# Patient Record
Sex: FEMALE | Race: WHITE
[De-identification: names, ages, dates, MRNs, and addresses within clinical notes are randomized per-mention and may not be internally consistent; named-entity substitution may affect disease eponyms.]

---

## 2018-06-01 PROBLEM — Z00.00 ENCOUNTER FOR PREVENTIVE HEALTH EXAMINATION: Status: ACTIVE | Noted: 2018-06-01

## 2018-07-11 ENCOUNTER — LABORATORY RESULT (OUTPATIENT)
Age: 38
End: 2018-07-11

## 2018-07-11 ENCOUNTER — APPOINTMENT (OUTPATIENT)
Dept: INTERNAL MEDICINE | Facility: CLINIC | Age: 38
End: 2018-07-11
Payer: MEDICARE

## 2018-07-11 VITALS
HEART RATE: 105 BPM | DIASTOLIC BLOOD PRESSURE: 72 MMHG | TEMPERATURE: 98.6 F | HEIGHT: 66 IN | BODY MASS INDEX: 21.86 KG/M2 | RESPIRATION RATE: 16 BRPM | OXYGEN SATURATION: 97 % | SYSTOLIC BLOOD PRESSURE: 100 MMHG | WEIGHT: 136 LBS

## 2018-07-11 DIAGNOSIS — F41.9 ANXIETY DISORDER, UNSPECIFIED: ICD-10-CM

## 2018-07-11 DIAGNOSIS — R10.9 UNSPECIFIED ABDOMINAL PAIN: ICD-10-CM

## 2018-07-11 DIAGNOSIS — Z00.01 ENCOUNTER FOR GENERAL ADULT MEDICAL EXAMINATION WITH ABNORMAL FINDINGS: ICD-10-CM

## 2018-07-11 DIAGNOSIS — G89.29 UNSPECIFIED ABDOMINAL PAIN: ICD-10-CM

## 2018-07-11 DIAGNOSIS — E83.42 HYPOMAGNESEMIA: ICD-10-CM

## 2018-07-11 DIAGNOSIS — E55.9 VITAMIN D DEFICIENCY, UNSPECIFIED: ICD-10-CM

## 2018-07-11 DIAGNOSIS — E87.6 HYPOKALEMIA: ICD-10-CM

## 2018-07-11 DIAGNOSIS — F51.01 PRIMARY INSOMNIA: ICD-10-CM

## 2018-07-11 DIAGNOSIS — Z81.1 FAMILY HISTORY OF ALCOHOL ABUSE AND DEPENDENCE: ICD-10-CM

## 2018-07-11 DIAGNOSIS — Z82.5 FAMILY HISTORY OF ASTHMA AND OTHER CHRONIC LOWER RESPIRATORY DISEASES: ICD-10-CM

## 2018-07-11 DIAGNOSIS — F17.200 NICOTINE DEPENDENCE, UNSPECIFIED, UNCOMPLICATED: ICD-10-CM

## 2018-07-11 DIAGNOSIS — Z87.39 PERSONAL HISTORY OF OTHER DISEASES OF THE MUSCULOSKELETAL SYSTEM AND CONNECTIVE TISSUE: ICD-10-CM

## 2018-07-11 DIAGNOSIS — K50.918 CROHN'S DISEASE, UNSPECIFIED, WITH OTHER COMPLICATION: ICD-10-CM

## 2018-07-11 PROCEDURE — 99406 BEHAV CHNG SMOKING 3-10 MIN: CPT

## 2018-07-11 PROCEDURE — 36415 COLL VENOUS BLD VENIPUNCTURE: CPT

## 2018-07-11 PROCEDURE — 99201 OFFICE OUTPATIENT NEW 10 MINUTES: CPT | Mod: 25

## 2018-07-11 PROCEDURE — G0439: CPT

## 2018-07-11 RX ORDER — ZOLPIDEM TARTRATE 10 MG/1
10 TABLET, FILM COATED ORAL
Refills: 0 | Status: DISCONTINUED | COMMUNITY
End: 2018-07-11

## 2018-07-11 RX ORDER — COPPER GLUCONATE 2 MG
250 TABLET ORAL
Refills: 0 | Status: ACTIVE | COMMUNITY

## 2018-07-11 RX ORDER — MAGNESIUM SULFATE HEPTAHYDRATE 500 MG/ML
50 INJECTION, SOLUTION INTRAMUSCULAR; INTRAVENOUS
Refills: 0 | Status: ACTIVE | COMMUNITY

## 2018-07-11 RX ORDER — ZOLPIDEM TARTRATE 10 MG/1
10 TABLET ORAL
Qty: 30 | Refills: 0 | Status: ACTIVE | COMMUNITY
Start: 2018-07-11 | End: 1900-01-01

## 2018-07-11 RX ORDER — CLONAZEPAM 2 MG/1
2 TABLET ORAL 3 TIMES DAILY
Refills: 0 | Status: DISCONTINUED | COMMUNITY
End: 2018-07-11

## 2018-07-11 RX ORDER — CHOLECALCIFEROL (VITAMIN D3) 1250 MCG
1.25 MG CAPSULE ORAL
Refills: 0 | Status: ACTIVE | COMMUNITY

## 2018-07-11 RX ORDER — HYDROCODONE BITARTRATE AND ACETAMINOPHEN 10; 325 MG/1; MG/1
10-325 TABLET ORAL
Refills: 0 | Status: DISCONTINUED | COMMUNITY
End: 2018-07-11

## 2018-07-11 RX ORDER — OXYCODONE HYDROCHLORIDE AND ACETAMINOPHEN 10; 325 MG/1; MG/1
10-325 TABLET ORAL
Refills: 0 | Status: DISCONTINUED | COMMUNITY
End: 2018-07-11

## 2018-07-11 RX ORDER — POTASSIUM CHLORIDE 750 MG/1
10 CAPSULE, EXTENDED RELEASE ORAL TWICE DAILY
Refills: 0 | Status: ACTIVE | COMMUNITY

## 2018-07-11 RX ORDER — OMEPRAZOLE MAGNESIUM 20 MG/1
20 CAPSULE, DELAYED RELEASE ORAL DAILY
Refills: 0 | Status: ACTIVE | COMMUNITY

## 2018-07-11 RX ORDER — LOPERAMIDE HYDROCHLORIDE 2 MG/1
2 TABLET ORAL
Refills: 0 | Status: ACTIVE | COMMUNITY

## 2018-07-11 NOTE — COUNSELING
[Healthy eating counseling provided] : healthy eating [Activity counseling provided] : activity [None] : None [Good understanding] : Patient has a good understanding of lifestyle changes and the steps needed to achieve self management goals [Tobacco Use Cessation Intermediate Greater Than 3 Minutes Up to 10 Minutes] : Tobacco Use Cessation Intermediate Greater Than 3 Minutes Up to 10 Minutes

## 2018-07-11 NOTE — HEALTH RISK ASSESSMENT
[Fair] : ~his/her~ current health as fair  [No falls in past year] : Patient reported no falls in the past year [0] : 2) Feeling down, depressed, or hopeless: Not at all (0) [Patient reported mammogram was normal] : Patient reported mammogram was normal [Patient reported PAP Smear was normal] : Patient reported PAP Smear was normal [Patient reported colonoscopy was abnormal] : Patient reported colonoscopy was abnormal [With Family] : lives with family [On disability] : on disability [Significant Other] : lives with significant other [Feels Safe at Home] : Feels safe at home [Reports changes in vision] : Reports changes in vision [Smoke Detector] : smoke detector [Carbon Monoxide Detector] : carbon monoxide detector [Seat Belt] :  uses seat belt [Sunscreen] : uses sunscreen [HIV test declined] : HIV test declined [Hepatitis C test offered] : Hepatitis C test offered [] : No [de-identified] : 2 cigs daily [[id number]] : 0 [Change in mental status noted] : No change in mental status noted [Reports changes in hearing] : Reports no changes in hearing [Reports changes in dental health] : Reports no changes in dental health [MammogramDate] : 2017 [PapSmearDate] : 2017 [ColonoscopyDate] : 2016 [HIVDate] : 2016 [HIVComments] : Patient reports negative test results.

## 2018-07-11 NOTE — ASSESSMENT
[FreeTextEntry1] : 37 y.o. woman with multiple medical comorbidities now with ongoing anxiety and chronic pain vs opioid dependence

## 2018-07-11 NOTE — PLAN
[FreeTextEntry1] : - [facility] Reference #: [id number] \par  - Labs done in office \par \par # Anxiety\par  - continue with current treatment for now\par  - Psychiatry evaluation \par \par # Chronic pain vs opioid dependence\par  - Patient insist on being prescribed her total 240 tablets\par  - risk and benefits were reviewed with the patient.\par  - Patient was given printed information for Dr. Maynor Aly who might be able to manage her chronic pain\par  - Patient is in agreement with with Urine drug screen\par \par # Nicotine dependence\par  - Smoking cessation counseling given for 4 minutes

## 2018-07-11 NOTE — PHYSICAL EXAM

## 2018-07-11 NOTE — HISTORY OF PRESENT ILLNESS
[FreeTextEntry1] : Patient presents for CPE and new patient evaluation.  [de-identified] : Patient presents for CPE and new patient evaluation. She reports a chronic history of abdominal pain secondary to her Crohn's disease. Patient reports having a chronic history of the pain which she states has been well managed on 150 tab of Percocet and 90 tablets of hydrocodone-acetaminophen. Presently, patient reports that she has not had her medications in the past three days and she is starting to feel. sick. Patient also reports a history anxiety for which she is on klonopin 2 mg PO TID, which she also states she has not had in the past few days as well.\par \par Regarding her crohn's disease, she states that she is currently not on any treatment other than pain medication as she was unable to tolerate any other treatments. \par \par No other complaints at present.

## 2018-07-17 LAB
25(OH)D3 SERPL-MCNC: 31.9 NG/ML
7 AMINOCLONAZEPAM UR QUANT: 72 CD:170654348
ALBUMIN SERPL ELPH-MCNC: 4 G/DL
ALP BLD-CCNC: 112 U/L
ALPHA HYDROXYALPRAZOLAM UR QUANT: 125 CD:170654348
ALPHA HYDROXYTRIAZOLAM UR QUANT: NOT DETECTED CD:170654348
ALPRAZOLAM UR QUANT: 42 CD:170654348
ALT SERPL-CCNC: 13 U/L
ANION GAP SERPL CALC-SCNC: 18 MMOL/L
AST SERPL-CCNC: 13 U/L
BASOPHILS # BLD AUTO: 0.04 K/UL
BASOPHILS NFR BLD AUTO: 0.4 %
BENZODIAZEPINES UR CONFIRMATION: ABNORMAL
BILIRUB SERPL-MCNC: <0.2 MG/DL
BUN SERPL-MCNC: 12 MG/DL
CALCIUM SERPL-MCNC: 8.8 MG/DL
CHLORIDE SERPL-SCNC: 108 MMOL/L
CHOLEST SERPL-MCNC: 122 MG/DL
CHOLEST/HDLC SERPL: 2.3 RATIO
CLONAZEPAM UR QUANT: NOT DETECTED CD:170654348
CO2 SERPL-SCNC: 17 MMOL/L
CREAT SERPL-MCNC: 1.18 MG/DL
DESALKYLFLURAZEPAM UR QUANT: NOT DETECTED CD:170654348
DESMETHYLDIAZEPAM UR QUANT: 356 CD:170654348
DIAZEPAM UR QUANT: NOT DETECTED CD:170654348
EOSINOPHIL # BLD AUTO: 0.08 K/UL
EOSINOPHIL NFR BLD AUTO: 0.7 %
GLUCOSE SERPL-MCNC: 81 MG/DL
HBA1C MFR BLD HPLC: 4.6 %
HCT VFR BLD CALC: 45.2 %
HDLC SERPL-MCNC: 52 MG/DL
HGB BLD-MCNC: 13.9 G/DL
HIV1+2 AB SPEC QL IA.RAPID: NONREACTIVE
IMM GRANULOCYTES NFR BLD AUTO: 0.3 %
LDLC SERPL CALC-MCNC: 50 MG/DL
LORAZEPAM UR QUANT: NOT DETECTED CD:170654348
LYMPHOCYTES # BLD AUTO: 1.82 K/UL
LYMPHOCYTES NFR BLD AUTO: 17 %
MAGNESIUM SERPL-MCNC: 2 MG/DL
MAN DIFF?: NORMAL
MCHC RBC-ENTMCNC: 30.3 PG
MCHC RBC-ENTMCNC: 30.8 GM/DL
MCV RBC AUTO: 98.7 FL
MIDAZOLAM UR QUANT: NOT DETECTED CD:170654348
MONOCYTES # BLD AUTO: 0.9 K/UL
MONOCYTES NFR BLD AUTO: 8.4 %
NEUTROPHILS # BLD AUTO: 7.85 K/UL
NEUTROPHILS NFR BLD AUTO: 73.2 %
OXAZEPAM UR QUANT: >1070 CD:170654348
OXYCODONE UR-MCNC: NORMAL
PLATELET # BLD AUTO: 389 K/UL
POTASSIUM SERPL-SCNC: 4.8 MMOL/L
PROT SERPL-MCNC: 7 G/DL
RBC # BLD: 4.58 M/UL
RBC # FLD: 13.6 %
SODIUM SERPL-SCNC: 143 MMOL/L
TEMAZEPAM UR QUANT: 791 CD:170654348
TRIGL SERPL-MCNC: 102 MG/DL
TSH SERPL-ACNC: 4.28 UIU/ML
WBC # FLD AUTO: 10.72 K/UL

## 2018-07-17 RX ORDER — OXYCODONE AND ACETAMINOPHEN 10; 325 MG/1; MG/1
10-325 TABLET ORAL
Qty: 120 | Refills: 0 | Status: DISCONTINUED | COMMUNITY
Start: 2018-07-11 | End: 2018-07-17

## 2018-07-17 RX ORDER — CLONAZEPAM 2 MG/1
2 TABLET ORAL
Qty: 90 | Refills: 0 | Status: DISCONTINUED | COMMUNITY
Start: 2018-07-11 | End: 2018-07-17